# Patient Record
Sex: MALE | Race: WHITE | NOT HISPANIC OR LATINO | Employment: OTHER | URBAN - METROPOLITAN AREA
[De-identification: names, ages, dates, MRNs, and addresses within clinical notes are randomized per-mention and may not be internally consistent; named-entity substitution may affect disease eponyms.]

---

## 2017-05-30 ENCOUNTER — OFFICE VISIT (OUTPATIENT)
Dept: URGENT CARE | Facility: CLINIC | Age: 67
End: 2017-05-30
Payer: MEDICARE

## 2017-05-30 VITALS
OXYGEN SATURATION: 96 % | TEMPERATURE: 98.1 F | RESPIRATION RATE: 20 BRPM | BODY MASS INDEX: 25.49 KG/M2 | WEIGHT: 205 LBS | SYSTOLIC BLOOD PRESSURE: 122 MMHG | HEIGHT: 75 IN | DIASTOLIC BLOOD PRESSURE: 80 MMHG | HEART RATE: 78 BPM

## 2017-05-30 DIAGNOSIS — H69.91 EUSTACHIAN TUBE DYSFUNCTION, RIGHT: ICD-10-CM

## 2017-05-30 DIAGNOSIS — B02.9 HERPES ZOSTER WITHOUT COMPLICATION: ICD-10-CM

## 2017-05-30 PROCEDURE — 4040F PNEUMOC VAC/ADMIN/RCVD: CPT | Mod: 8P | Performed by: FAMILY MEDICINE

## 2017-05-30 PROCEDURE — 4004F PT TOBACCO SCREEN RCVD TLK: CPT | Mod: 8P | Performed by: FAMILY MEDICINE

## 2017-05-30 PROCEDURE — 3017F COLORECTAL CA SCREEN DOC REV: CPT | Mod: 8P | Performed by: FAMILY MEDICINE

## 2017-05-30 PROCEDURE — G8419 CALC BMI OUT NRM PARAM NOF/U: HCPCS | Performed by: FAMILY MEDICINE

## 2017-05-30 PROCEDURE — 1101F PT FALLS ASSESS-DOCD LE1/YR: CPT | Mod: 8P | Performed by: FAMILY MEDICINE

## 2017-05-30 PROCEDURE — 99204 OFFICE O/P NEW MOD 45 MIN: CPT | Performed by: FAMILY MEDICINE

## 2017-05-30 PROCEDURE — G8432 DEP SCR NOT DOC, RNG: HCPCS | Performed by: FAMILY MEDICINE

## 2017-05-30 RX ORDER — VALSARTAN 80 MG/1
80 TABLET ORAL DAILY
COMMUNITY

## 2017-05-30 RX ORDER — FAMCICLOVIR 500 MG/1
500 TABLET ORAL 3 TIMES DAILY
Qty: 21 TAB | Refills: 0 | Status: SHIPPED | OUTPATIENT
Start: 2017-05-30 | End: 2017-06-06

## 2017-05-30 RX ORDER — SIMVASTATIN 20 MG
20 TABLET ORAL NIGHTLY
COMMUNITY

## 2017-05-30 RX ORDER — AMOXICILLIN AND CLAVULANATE POTASSIUM 875; 125 MG/1; MG/1
1 TABLET, FILM COATED ORAL 2 TIMES DAILY
Qty: 20 TAB | Refills: 0 | Status: SHIPPED | OUTPATIENT
Start: 2017-05-30 | End: 2017-06-09

## 2017-05-30 NOTE — MR AVS SNAPSHOT
"        Umesh Barrow   2017 6:45 PM   Office Visit   MRN: [id number]    Department:  [facility]   Dept Phone:  [phone]    Description:  Male : 1950   Provider:  Eugenia Guthrie D.O.           Reason for Visit     Otalgia Today right earache    Rash Red spots on face and head      Allergies as of 2017     No Known Allergies      You were diagnosed with     Herpes zoster without complication   [403868]       Eustachian tube dysfunction, right   [528776]         Vital Signs     Blood Pressure Pulse Temperature Respirations Height Weight    122/80 mmHg 78 36.7 °C (98.1 °F) 20 1.905 m (6' 3\") 92.987 kg (205 lb)    Body Mass Index Oxygen Saturation                25.62 kg/m2 96%          Basic Information     Date Of Birth Sex Race Ethnicity Preferred Language    1950 Male White Non- English      Health Maintenance     Patient has no pending health maintenance at this time      Current Immunizations     No immunizations on file.      Below and/or attached are the medications your provider expects you to take. Review all of your home medications and newly ordered medications with your provider and/or pharmacist. Follow medication instructions as directed by your provider and/or pharmacist. Please keep your medication list with you and share with your provider. Update the information when medications are discontinued, doses are changed, or new medications (including over-the-counter products) are added; and carry medication information at all times in the event of emergency situations     Allergies:  No Known Allergies          Medications  Valid as of: May 30, 2017 -  7:12 PM    Generic Name Brand Name Tablet Size Instructions for use    Amoxicillin-Pot Clavulanate (Tab) AUGMENTIN 875-125 MG Take 1 Tab by mouth 2 times a day for 10 days.        Famciclovir (Tab) FAMVIR 500 MG Take 1 Tab by mouth 3 times a day for 7 days.        Simvastatin (Tab) ZOCOR 20 MG Take 20 mg by " mouth every evening.        Valsartan (Tab) DIOVAN 80 MG Take 80 mg by mouth every day.        .                 Medicines prescribed today were sent to:     [id number] - [place], [place] - [address]    [address] [place] [address]    Phone: [phone] Fax: [phone]    Open 24 Hours?: No      Medication refill instructions:       If your prescription bottle indicates you have medication refills left, it is not necessary to call your provider’s office. Please contact your pharmacy and they will refill your medication.    If your prescription bottle indicates you do not have any refills left, you may request refills at any time through one of the following ways: The online JoinTV system (except Urgent Care), by calling your provider’s office, or by asking your pharmacy to contact your provider’s office with a refill request. Medication refills are processed only during regular business hours and may not be available until the next business day. Your provider may request additional information or to have a follow-up visit with you prior to refilling your medication.   *Please Note: Medication refills are assigned a new Rx number when refilled electronically. Your pharmacy may indicate that no refills were authorized even though a new prescription for the same medication is available at the pharmacy. Please request the medicine by name with the pharmacy before contacting your provider for a refill.           JoinTV Access Code: [email or web address]  Expires: 6/29/2017  7:12 PM    Your email address is not on file at [employer].  Email Addresses are required for you to sign up for JoinTV, please contact [phone] to verify your personal information and to provide your email address prior to attempting to register for JoinTV.    Umesh Barrow  [address] [place] [address]    JoinTV  A secure, online tool to manage your health information     [employer]’s JoinTV® is a secure,  online tool that connects you to your personalized health information from the privacy of your home -- day or night - making it very easy for you to manage your healthcare. Once the activation process is completed, you can even access your medical information using the SIGFOX dariana, which is available for free in the Apple Dariana store or Google Play store.     To learn more about SIGFOX, visit [email or web address]    There are two levels of access available (as shown below):   My Chart Features  [facility]  Specialists [facility]  Urgent  Care [facility] Doctor   Email your healthcare team securely and privately 24/7 X X X    Manage appointments: schedule your next appointment; view details of past/upcoming appointments X      Request prescription refills. X      View recent personal medical records, including lab and immunizations X X X X   View health record, including health history, allergies, medications X X X X   Read reports about your outpatient visits, procedures, consult and ER notes X X X X   See your discharge summary, which is a recap of your hospital and/or ER visit that includes your diagnosis, lab results, and care plan X X  X     How to register for SIGFOX:  Once your e-mail address has been verified, follow the following steps to sign up for SIGFOX.     1. Go to  [email or web address]  2. Click on the Sign Up Now box, which takes you to the New Member Sign Up page. You will need to provide the following information:  a. Enter your SIGFOX Access Code exactly as it appears at the top of this page. (You will not need to use this code after you’ve completed the sign-up process. If you do not sign up before the expiration date, you must request a new code.)   b. Enter your date of birth.   c. Enter your home email address.   d. Click Submit, and follow the next screen’s instructions.  3. Create a SIGFOX ID. This will be your SIGFOX login ID and cannot be  changed, so think of one that is secure and easy to remember.  4. Create a [employer] password. You can change your password at any time.  5. Enter your Password Reset Question and Answer. This can be used at a later time if you forget your password.   6. Enter your e-mail address. This allows you to receive e-mail notifications when new information is available in [employer].  7. Click Sign Up. You can now view your health information.    For assistance activating your [employer] account, call [phone]

## 2017-05-31 NOTE — PROGRESS NOTES
"Subjective:      Umesh Barrow is a 66 y.o. male who presents with Otalgia and Rash    Patient presented to urgent care with right sided ear pain and scalp pain and a rash that has developed over the right side of his face and his neck over the past 3-4 days.. Patient has received most of shingles vaccination. He denies any history of shingles. No fevers  has had some body aches, chills and fatigue Over the past several days. No cough or chest congestion. No sore throat     Otalgia   Associated symptoms include a rash.   Rash        Review of Systems   HENT: Positive for ear pain.    Skin: Positive for rash.     PMH: essential hypertension  MEDS:   Current outpatient prescriptions:   •  valsartan (DIOVAN) 80 MG Tab, Take 80 mg by mouth every day., Disp: , Rfl:   •  simvastatin (ZOCOR) 20 MG Tab, Take 20 mg by mouth every evening., Disp: , Rfl:   •  famciclovir (FAMVIR) 500 MG Tab, Take 1 Tab by mouth 3 times a day for 7 days., Disp: 21 Tab, Rfl: 0  •  amoxicillin-clavulanate (AUGMENTIN) 875-125 MG Tab, Take 1 Tab by mouth 2 times a day for 10 days., Disp: 20 Tab, Rfl: 0  ALLERGIES: No Known Allergies  SURGHX: History reviewed. No pertinent past surgical history.  SOCHX:  reports that he has never smoked. He has never used smokeless tobacco.  FH: Family history was reviewed, no pertinent findings to report    Objective:     /80 mmHg  Pulse 78  Temp(Src) 36.7 °C (98.1 °F)  Resp 20  Ht 1.905 m (6' 3\")  Wt 92.987 kg (205 lb)  BMI 25.62 kg/m2  SpO2 96%     Physical Exam   Constitutional: He is oriented to person, place, and time. He appears well-developed and well-nourished. No distress.   HENT:   Head:       Left Ear: Tympanic membrane is injected and erythematous.   Mouth/Throat: Oropharynx is clear and moist.   Discrete rash one patch that appears to have a vesicle forming   Eyes: Conjunctivae and EOM are normal. Pupils are equal, round, and reactive to light.   Neck: Normal range of motion. Neck " supple.   Cardiovascular: Normal rate, regular rhythm, normal heart sounds and intact distal pulses.  Exam reveals no gallop and no friction rub.    No murmur heard.  Pulmonary/Chest: Effort normal and breath sounds normal. No respiratory distress. He has no wheezes. He has no rales. He exhibits no tenderness.   Musculoskeletal: Normal range of motion. He exhibits no edema or tenderness.   Neurological: He is alert and oriented to person, place, and time.   Skin: Skin is warm and dry. Rash noted. He is not diaphoretic.   Psychiatric: He has a normal mood and affect. His behavior is normal.          Assessment/Plan:     1. Herpes zoster without complication  famciclovir (FAMVIR) 500 MG Tab   2. Eustachian tube dysfunction, right  amoxicillin-clavulanate (AUGMENTIN) 875-125 MG Tab   3. lEssential hypertension stable on meds cont as directed    Patient was given a contingent antibiotic prescription to fill and use as directed if symptoms progressed as discussed and agreed upon.